# Patient Record
Sex: FEMALE | Race: BLACK OR AFRICAN AMERICAN | Employment: FULL TIME | URBAN - METROPOLITAN AREA
[De-identification: names, ages, dates, MRNs, and addresses within clinical notes are randomized per-mention and may not be internally consistent; named-entity substitution may affect disease eponyms.]

---

## 2018-02-12 ENCOUNTER — NON-PROVIDER VISIT (OUTPATIENT)
Dept: URGENT CARE | Facility: CLINIC | Age: 18
End: 2018-02-12

## 2018-02-12 DIAGNOSIS — Z11.1 PPD SCREENING TEST: ICD-10-CM

## 2018-02-12 PROCEDURE — 86580 TB INTRADERMAL TEST: CPT | Performed by: PHYSICIAN ASSISTANT

## 2018-02-12 NOTE — NON-PROVIDER
VISIT PPD PLACEMENT REASON:20947    1- TB evaluation questionnaire complete by Pt  2- Pt notified to return to clinic for reading after 48 hrs tiffany before 72 hrs , Copy was given  3- PPD placement documentation co,pleted  4- Questionnaire eval was filed .

## 2018-02-14 ENCOUNTER — NON-PROVIDER VISIT (OUTPATIENT)
Dept: URGENT CARE | Facility: CLINIC | Age: 18
End: 2018-02-14

## 2018-02-14 DIAGNOSIS — Z11.1 PPD SCREENING TEST: ICD-10-CM

## 2018-02-14 LAB — TB WHEAL 3D P 5 TU DIAM: 0 MM

## 2018-02-19 ENCOUNTER — NON-PROVIDER VISIT (OUTPATIENT)
Dept: URGENT CARE | Facility: CLINIC | Age: 18
End: 2018-02-19

## 2018-02-19 DIAGNOSIS — Z11.1 PPD SCREENING TEST: ICD-10-CM

## 2018-02-19 PROCEDURE — 86580 TB INTRADERMAL TEST: CPT | Performed by: PHYSICIAN ASSISTANT

## 2018-02-20 NOTE — NON-PROVIDER
VISIT PPD PLACEMENT REASON:[id number]    1- TB evaluation questionnaire complete by Pt  2- Pt notified to return to clinic for reading after 48 hrs tiffany before 72 hrs , Copy was given  3- PPD placement documentation co,pleted  4- Questionnaire eval was filed .

## 2018-02-22 ENCOUNTER — NON-PROVIDER VISIT (OUTPATIENT)
Dept: URGENT CARE | Facility: CLINIC | Age: 18
End: 2018-02-22

## 2018-02-22 DIAGNOSIS — Z11.1 PPD SCREENING TEST: ICD-10-CM

## 2018-02-22 LAB — TB WHEAL 3D P 5 TU DIAM: NORMAL MM

## 2018-06-02 ENCOUNTER — OFFICE VISIT (OUTPATIENT)
Dept: URGENT CARE | Facility: CLINIC | Age: 18
End: 2018-06-02
Payer: COMMERCIAL

## 2018-06-02 VITALS
TEMPERATURE: 97.5 F | OXYGEN SATURATION: 97 % | DIASTOLIC BLOOD PRESSURE: 72 MMHG | WEIGHT: 125 LBS | HEART RATE: 74 BPM | SYSTOLIC BLOOD PRESSURE: 110 MMHG | RESPIRATION RATE: 17 BRPM | HEIGHT: 64 IN | BODY MASS INDEX: 21.34 KG/M2

## 2018-06-02 DIAGNOSIS — S61.219A FINGER LACERATION, INITIAL ENCOUNTER: ICD-10-CM

## 2018-06-02 PROCEDURE — 12002 RPR S/N/AX/GEN/TRNK2.6-7.5CM: CPT | Performed by: FAMILY MEDICINE

## 2018-06-02 RX ORDER — AMOXICILLIN AND CLAVULANATE POTASSIUM 875; 125 MG/1; MG/1
1 TABLET, FILM COATED ORAL 2 TIMES DAILY
Qty: 6 TAB | Refills: 0 | Status: SHIPPED | OUTPATIENT
Start: 2018-06-02 | End: 2018-06-05

## 2018-06-02 ASSESSMENT — ENCOUNTER SYMPTOMS
CHILLS: 0
FEVER: 0
WEAKNESS: 0

## 2018-06-02 NOTE — PROGRESS NOTES
"Subjective:      Ruthy Russell is a 17 y.o. female who presents with Laceration (left hand index finger, cutting an avocado this mornig, knife sliped and cut finger)    Pleasant patient presents to urgent care with a laceration to her left proximal palmar finger joint region. Superficial laceration long mild active bleeding initially after she cut it she was cutting an elbow caught of this morning is right hand dominant and lacerated her finger. Her tetanus is up-to-date. She is very apprehensive of touching the finger cleaning observing the possibility of suturing. She does not have any complicated history of wound healing in the past. During conversation she starts to feel a little woozy and lightheaded asks if she can have something to eat.    HPI  Review of Systems   Constitutional: Negative for chills and fever.   Skin: Negative for itching.   Neurological: Negative for weakness.   All other systems reviewed and are negative.   Review of Systems performed. All other systems are negative except for what is listed above.     PMH:  has a past medical history of Known health problems: none.  MEDS:   Current Outpatient Prescriptions:   •  amoxicillin-clavulanate (AUGMENTIN) 875-125 MG Tab, Take 1 Tab by mouth 2 times a day for 3 days. With food and probiotics, Disp: 6 Tab, Rfl: 0  •  Estradiol Cypionate (DEPO-ESTRADIOL IM), by Intramuscular route., Disp: , Rfl:   •  ondansetron (ZOFRAN ODT) 4 MG TABLET DISPERSIBLE, Take 1 Tab by mouth every four hours as needed for Nausea/Vomiting., Disp: 10 Tab, Rfl: 1  ALLERGIES: No Known Allergies  SURGHX: History reviewed. No pertinent surgical history.  SOCHX:  reports that she has never smoked. She has never used smokeless tobacco. She reports that she does not drink alcohol or use drugs.  FH: Family history was reviewed, no pertinent findings to report   Objective:     /72   Pulse 74   Temp 36.4 °C (97.5 °F)   Resp 17   Ht 1.626 m (5' 4\")   Wt 56.7 kg (125 lb)   " SpO2 97%   Breastfeeding? No   BMI 21.46 kg/m²      Physical Exam   Constitutional: She is oriented to person, place, and time. She appears well-developed and well-nourished. No distress.   HENT:   Mouth/Throat: Oropharynx is clear and moist.   Neck: Normal range of motion.   Cardiovascular: Normal rate.    Pulmonary/Chest: Effort normal.   Musculoskeletal: Normal range of motion. She exhibits tenderness. She exhibits no edema or deformity.        Left hand: She exhibits tenderness and laceration. She exhibits normal range of motion and no bony tenderness. Normal sensation noted. Normal strength noted.        Hands:  Superficial but long laceration of finger no active bleeding 2.7cm   Neurological: She is alert and oriented to person, place, and time. Coordination normal.   Skin: Skin is warm and dry. No rash noted. She is not diaphoretic. No erythema. No pallor.   Psychiatric: She has a normal mood and affect. Her behavior is normal. Judgment and thought content normal.         Procedures: Area was prepped and cleaned and observed no evidence of foreign body patient did not tolerate this very well even after topical lidocaine application. Dermabond was applied with good approximation as well as some Steri-Strips.   Assessment/Plan:     1. Finger laceration, initial encounter  amoxicillin-clavulanate (AUGMENTIN) 875-125 MG Tab   Differential diagnosis, natural history, supportive care discussed. Follow-up with primary care provider within 7-10 days, emergency room precautions discussed.  Patient and/or family appears understanding of information.    She is reassured that she can come back to urgent care if she notices any signs of infection or wound does not appear that is healing properly. We discussed sutures to this site the patient was very fearful and preferred to do Dermabond

## 2019-07-30 ENCOUNTER — OFFICE VISIT (OUTPATIENT)
Dept: URGENT CARE | Facility: CLINIC | Age: 19
End: 2019-07-30
Payer: COMMERCIAL

## 2019-07-30 VITALS
BODY MASS INDEX: 31.01 KG/M2 | OXYGEN SATURATION: 96 % | HEIGHT: 63 IN | TEMPERATURE: 98.4 F | WEIGHT: 175 LBS | RESPIRATION RATE: 14 BRPM | HEART RATE: 76 BPM | SYSTOLIC BLOOD PRESSURE: 124 MMHG | DIASTOLIC BLOOD PRESSURE: 76 MMHG

## 2019-07-30 DIAGNOSIS — M54.50 ACUTE BILATERAL LOW BACK PAIN WITHOUT SCIATICA: ICD-10-CM

## 2019-07-30 LAB
APPEARANCE UR: CLEAR
BILIRUB UR STRIP-MCNC: NEGATIVE MG/DL
COLOR UR AUTO: YELLOW
GLUCOSE UR STRIP.AUTO-MCNC: NEGATIVE MG/DL
KETONES UR STRIP.AUTO-MCNC: NEGATIVE MG/DL
LEUKOCYTE ESTERASE UR QL STRIP.AUTO: NEGATIVE
NITRITE UR QL STRIP.AUTO: NEGATIVE
PH UR STRIP.AUTO: 6 [PH] (ref 5–8)
PROT UR QL STRIP: NORMAL MG/DL
RBC UR QL AUTO: NEGATIVE
SP GR UR STRIP.AUTO: 1.02
UROBILINOGEN UR STRIP-MCNC: 0.2 MG/DL

## 2019-07-30 PROCEDURE — 99203 OFFICE O/P NEW LOW 30 MIN: CPT | Performed by: PHYSICIAN ASSISTANT

## 2019-07-30 PROCEDURE — 81002 URINALYSIS NONAUTO W/O SCOPE: CPT | Performed by: PHYSICIAN ASSISTANT

## 2019-07-30 RX ORDER — CYCLOBENZAPRINE HCL 10 MG
10 TABLET ORAL 3 TIMES DAILY PRN
Qty: 30 TAB | Refills: 0 | Status: SHIPPED | OUTPATIENT
Start: 2019-07-30 | End: 2020-09-30

## 2019-07-30 RX ORDER — PREDNISONE 20 MG/1
20 TABLET ORAL DAILY
Qty: 30 TAB | Refills: 0 | Status: SHIPPED | OUTPATIENT
Start: 2019-07-30 | End: 2020-09-30

## 2019-07-30 RX ORDER — TRAMADOL HYDROCHLORIDE 50 MG/1
50 TABLET ORAL EVERY 8 HOURS PRN
Qty: 10 TAB | Refills: 0 | Status: SHIPPED | OUTPATIENT
Start: 2019-07-30 | End: 2019-08-02

## 2019-07-30 NOTE — LETTER
July 30, 2019         Patient: Brooke Burciaga   YOB: 2000   Date of Visit: 7/30/2019           To Whom it May Concern:    Brooke Burciaga was seen in my clinic on 7/30/2019. Please excuse her absence on 7/31/19.     If you have any questions or concerns, please don't hesitate to call.        Sincerely,           Alice Palmer P.A.-C.  Electronically Signed

## 2019-08-01 ASSESSMENT — ENCOUNTER SYMPTOMS
VOMITING: 0
LEG PAIN: 0
BOWEL INCONTINENCE: 0
FEVER: 0
DIZZINESS: 0
NAUSEA: 0
BACK PAIN: 1
ABDOMINAL PAIN: 0
TINGLING: 0
SHORTNESS OF BREATH: 0
DIARRHEA: 0
CHILLS: 0
FALLS: 0
SPUTUM PRODUCTION: 0
NUMBNESS: 0

## 2019-08-01 NOTE — PROGRESS NOTES
"Subjective:      Brooke Burciaga is a 19 y.o. female who presents with Back Pain (low back pain, sharp spasms after going to the gym x 1 week ago)            Back Pain   This is a new problem. The current episode started 1 to 4 weeks ago (1 week). The problem occurs constantly. The problem has been waxing and waning since onset. The pain is present in the lumbar spine. The pain does not radiate. The pain is at a severity of 6/10. The pain is moderate. Pertinent negatives include no abdominal pain, bladder incontinence, bowel incontinence, chest pain, fever, leg pain, numbness or tingling. She has tried NSAIDs for the symptoms. The treatment provided mild relief.     Patient presents to urgent care reporting a 1 week history of low back pain and sharp spasms. She believes it started after going to the gym, although she denies any specific injuries.     Review of Systems   Constitutional: Negative for chills and fever.   HENT: Negative for congestion.    Respiratory: Negative for sputum production and shortness of breath.    Cardiovascular: Negative for chest pain.   Gastrointestinal: Negative for abdominal pain, bowel incontinence, diarrhea, nausea and vomiting.   Genitourinary: Negative.  Negative for bladder incontinence.   Musculoskeletal: Positive for back pain. Negative for falls.   Skin: Negative for rash.   Neurological: Negative for dizziness, tingling and numbness.        Objective:     /76 (BP Location: Left arm, Patient Position: Sitting, BP Cuff Size: Adult)   Pulse 76   Temp 36.9 °C (98.4 °F) (*RETIRED* Temporal)   Resp 14   Ht 1.6 m (5' 2.99\")   Wt 79.4 kg (175 lb)   SpO2 96%   BMI 31.01 kg/m²      Physical Exam   Constitutional: She is oriented to person, place, and time. She appears well-developed and well-nourished. No distress.   HENT:   Head: Normocephalic and atraumatic.   Eyes: Pupils are equal, round, and reactive to light.   Neck: Normal range of motion.   Cardiovascular: Normal rate. "   Pulmonary/Chest: Effort normal.   Abdominal: Soft. Bowel sounds are normal. She exhibits no distension. There is no tenderness. There is no guarding.   No CVAT bilaterally    Musculoskeletal:        Lumbar back: She exhibits decreased range of motion, tenderness and pain. She exhibits no bony tenderness and no spasm.        Back:    No midline spinal tenderness or step off deformities noted. Straight leg raise negative.    Neurological: She is alert and oriented to person, place, and time.   Skin: Skin is warm and dry. She is not diaphoretic.   Psychiatric: She has a normal mood and affect. Her behavior is normal.   Nursing note and vitals reviewed.          PMH:  has no past medical history on file.  MEDS:   Current Outpatient Medications:   •  predniSONE (DELTASONE) 20 MG Tab, Take 1 Tab by mouth every day., Disp: 30 Tab, Rfl: 0  •  tramadol (ULTRAM) 50 MG Tab, Take 1 Tab by mouth every 8 hours as needed for up to 3 days., Disp: 10 Tab, Rfl: 0  •  cyclobenzaprine (FLEXERIL) 10 MG Tab, Take 1 Tab by mouth 3 times a day as needed., Disp: 30 Tab, Rfl: 0  ALLERGIES: No Known Allergies  SURGHX: History reviewed. No pertinent surgical history.  SOCHX:  reports that she has never smoked. She has never used smokeless tobacco. She reports that she does not drink alcohol or use drugs.  FH: family history is not on file.     POCT Urinalysis:      Ref Range & Units 2d ago   POC Color Negative yellow    POC Appearance Negative clear    POC Leukocyte Esterase Negative negative    POC Nitrites Negative negative    POC Urobiligen Negative (0.2) mg/dL 0.2    POC Protein Negative mg/dL nnegative    POC Urine PH 5.0 - 8.0 6.0    POC Blood Negative negative    POC Specific Gravity <1.005 - >1.030 1.020    POC Ketones Negative mg/dL negative    POC Bilirubin Negative mg/dL negative    POC Glucose Negative mg/dL negative          Specimen Collected: 07/30/19  3:24 PM   Last Resulted: 07/30/19  3:24 PM          Assessment/Plan:     1.  Acute bilateral low back pain without sciatica  - POCT Urinalysis --> normal  - POCT Pregnancy --> negative   - predniSONE (DELTASONE) 20 MG Tab; Take 1 Tab by mouth every day.  Dispense: 30 Tab; Refill: 0  - tramadol (ULTRAM) 50 MG Tab; Take 1 Tab by mouth every 8 hours as needed for up to 3 days.  Dispense: 10 Tab; Refill: 0  - cyclobenzaprine (FLEXERIL) 10 MG Tab; Take 1 Tab by mouth 3 times a day as needed.  Dispense: 30 Tab; Refill: 0   - Will cause sedation, avoid driving, operating heavy machinery, and drinking alcohol  - Consent for Opiate Prescription    Encouraged icing/heating 2-3 times daily followed by gentle massage and stretching. Avoid use of nsaids while taking course of oral steroids due to increased risk of GI bleed. Tylenol as needed for mild-moderate pain, tramadol given to take as needed for severe pain. RTC or follow up with PCP if symptoms persist/worsen. The patient demonstrated a good understanding and agreed with the treatment plan.    [facility] Aware web site evaluation: I have obtained and reviewed patient utilization report from [facility] of the [facility] pharmacy database prior to writing prescription for controlled substance II, III or IV per [place] bill . Based on the report and my clinical assessment the prescription is medically necessary.

## 2019-09-12 ENCOUNTER — NON-PROVIDER VISIT (OUTPATIENT)
Dept: OCCUPATIONAL MEDICINE | Facility: CLINIC | Age: 19
End: 2019-09-12

## 2019-09-12 DIAGNOSIS — Z11.1 ENCOUNTER FOR PPD TEST: Primary | ICD-10-CM

## 2019-09-12 PROCEDURE — 86580 TB INTRADERMAL TEST: CPT | Performed by: PREVENTIVE MEDICINE

## 2019-09-15 ENCOUNTER — NON-PROVIDER VISIT (OUTPATIENT)
Dept: URGENT CARE | Facility: CLINIC | Age: 19
End: 2019-09-15

## 2019-09-15 LAB — TB WHEAL 3D P 5 TU DIAM: 0 MM

## 2019-09-15 NOTE — PROGRESS NOTES
Brooke Burciaga is a 19 y.o. female here for a non-provider visit for PPD reading -- Step 1 of 2.      1.  Resulted in Epic under enter/edit results? Yes   2.  TB evaluation questionnaire scanned into chart and original given to patient?Yes      3. Was induration greater than 0 mm? No.    If Step 1 of 2, when is patient returning for second step (delete if N/A): 9/19/19    Routed to PCP? Yes

## 2019-11-23 ENCOUNTER — OFFICE VISIT (OUTPATIENT)
Dept: URGENT CARE | Facility: CLINIC | Age: 19
End: 2019-11-23
Payer: COMMERCIAL

## 2019-11-23 VITALS
TEMPERATURE: 97.7 F | BODY MASS INDEX: 28.61 KG/M2 | RESPIRATION RATE: 12 BRPM | OXYGEN SATURATION: 98 % | HEART RATE: 99 BPM | WEIGHT: 167.6 LBS | SYSTOLIC BLOOD PRESSURE: 118 MMHG | HEIGHT: 64 IN | DIASTOLIC BLOOD PRESSURE: 66 MMHG

## 2019-11-23 DIAGNOSIS — J02.0 STREP PHARYNGITIS: ICD-10-CM

## 2019-11-23 DIAGNOSIS — J01.00 ACUTE NON-RECURRENT MAXILLARY SINUSITIS: ICD-10-CM

## 2019-11-23 DIAGNOSIS — R05.9 COUGH: ICD-10-CM

## 2019-11-23 LAB
INT CON NEG: NEGATIVE
INT CON POS: POSITIVE
S PYO AG THROAT QL: POSITIVE

## 2019-11-23 PROCEDURE — 87880 STREP A ASSAY W/OPTIC: CPT | Performed by: FAMILY MEDICINE

## 2019-11-23 PROCEDURE — 99214 OFFICE O/P EST MOD 30 MIN: CPT | Performed by: FAMILY MEDICINE

## 2019-11-23 RX ORDER — BENZONATATE 200 MG/1
200 CAPSULE ORAL 3 TIMES DAILY PRN
Qty: 30 CAP | Refills: 0 | Status: SHIPPED | OUTPATIENT
Start: 2019-11-23 | End: 2020-09-30

## 2019-11-23 RX ORDER — AMOXICILLIN AND CLAVULANATE POTASSIUM 875; 125 MG/1; MG/1
1 TABLET, FILM COATED ORAL 2 TIMES DAILY
Qty: 20 TAB | Refills: 0 | Status: SHIPPED | OUTPATIENT
Start: 2019-11-23 | End: 2019-12-03

## 2019-11-23 RX ORDER — AMOXICILLIN AND CLAVULANATE POTASSIUM 875; 125 MG/1; MG/1
1 TABLET, FILM COATED ORAL 2 TIMES DAILY
Qty: 14 TAB | Refills: 0 | Status: SHIPPED | OUTPATIENT
Start: 2019-11-23 | End: 2019-11-23

## 2019-11-23 ASSESSMENT — ENCOUNTER SYMPTOMS
MYALGIAS: 0
HEADACHES: 1
WEIGHT LOSS: 0
VOMITING: 0
EYE REDNESS: 0
EYE DISCHARGE: 0
NAUSEA: 0

## 2019-11-23 NOTE — PROGRESS NOTES
"Subjective:      Brooke Burciaga is a 19 y.o. female who presents with Sinus Pain (x 2 weeks.  Pt. complains of sinus pain, sinus and chest congestion, headache, cough, chills and sore throat. )            2 weeks sinus pressure and drainage.  Subjective fever and chills.  No PMH sinusitis.  Cough and sore throat due to drainage.  No wheeze or SOB. No relief of severe symptoms with OTC medications.  No other aggravating or alleviating factors.      Review of Systems   Constitutional: Negative for malaise/fatigue and weight loss.   HENT: Negative for ear discharge and ear pain.    Eyes: Negative for discharge and redness.   Gastrointestinal: Negative for nausea and vomiting.   Musculoskeletal: Negative for joint pain and myalgias.   Skin: Negative for itching and rash.   Neurological: Positive for headaches.     .  Medications, Allergies, and current problem list reviewed today in Epic       Objective:     /66   Pulse 99   Temp 36.5 °C (97.7 °F) (Temporal)   Resp 12   Ht 1.626 m (5' 4\")   Wt 76 kg (167 lb 9.6 oz)   LMP 11/08/2019   SpO2 98%   BMI 28.77 kg/m²      Physical Exam  Constitutional:       General: She is not in acute distress.     Appearance: She is well-developed.   HENT:      Head: Normocephalic and atraumatic.      Comments: Tender maxillary sinus     Right Ear: Tympanic membrane normal.      Left Ear: Tympanic membrane normal.      Nose: Congestion and rhinorrhea present.      Mouth/Throat:      Pharynx: Posterior oropharyngeal erythema present. No oropharyngeal exudate.   Eyes:      Conjunctiva/sclera: Conjunctivae normal.   Neck:      Musculoskeletal: Neck supple.   Cardiovascular:      Rate and Rhythm: Normal rate and regular rhythm.      Heart sounds: Normal heart sounds. No murmur.   Pulmonary:      Effort: Pulmonary effort is normal.      Breath sounds: Normal breath sounds. No wheezing.   Lymphadenopathy:      Cervical: Cervical adenopathy present.   Skin:     General: Skin is warm " and dry.      Findings: No rash.   Neurological:      Mental Status: She is alert and oriented to person, place, and time.                 Assessment/Plan:     1. Acute non-recurrent maxillary sinusitis  amoxicillin-clavulanate (AUGMENTIN) 875-125 MG Tab    DISCONTINUED: amoxicillin-clavulanate (AUGMENTIN) 875-125 MG Tab   2. Cough  benzonatate (TESSALON) 200 MG capsule   3. Strep pharyngitis  POCT Rapid Strep A    amoxicillin-clavulanate (AUGMENTIN) 875-125 MG Tab       Differential diagnosis, natural history, supportive care, and indications for immediate follow-up discussed at length.     Nasal saline, decongestant, nasal CS

## 2019-11-23 NOTE — LETTER
November 23, 2019         Patient: Brooke Burciaga   YOB: 2000   Date of Visit: 11/23/2019           To Whom it May Concern:    Brooke Burciaga was seen in my clinic on 11/23/2019. Please excuse 11/23 and 11/24/2019.    Sincerely,           Virgil Harris M.D.  Electronically Signed

## 2020-03-10 ENCOUNTER — OFFICE VISIT (OUTPATIENT)
Dept: URGENT CARE | Facility: CLINIC | Age: 20
End: 2020-03-10
Payer: COMMERCIAL

## 2020-03-10 VITALS
HEIGHT: 63 IN | RESPIRATION RATE: 16 BRPM | OXYGEN SATURATION: 96 % | BODY MASS INDEX: 22.32 KG/M2 | WEIGHT: 126 LBS | TEMPERATURE: 98.5 F | DIASTOLIC BLOOD PRESSURE: 78 MMHG | SYSTOLIC BLOOD PRESSURE: 120 MMHG | HEART RATE: 80 BPM

## 2020-03-10 DIAGNOSIS — R11.10 INTRACTABLE VOMITING, PRESENCE OF NAUSEA NOT SPECIFIED, UNSPECIFIED VOMITING TYPE: ICD-10-CM

## 2020-03-10 PROCEDURE — 99214 OFFICE O/P EST MOD 30 MIN: CPT | Performed by: NURSE PRACTITIONER

## 2020-03-10 RX ORDER — ACETAMINOPHEN 500 MG
1000 TABLET ORAL ONCE
Status: DISCONTINUED | OUTPATIENT
Start: 2020-03-10 | End: 2020-03-10

## 2020-03-10 RX ORDER — ONDANSETRON 4 MG/1
4 TABLET, ORALLY DISINTEGRATING ORAL EVERY 6 HOURS PRN
Qty: 10 TAB | Refills: 0 | Status: SHIPPED | OUTPATIENT
Start: 2020-03-10

## 2020-03-10 RX ORDER — ONDANSETRON 2 MG/ML
4 INJECTION INTRAMUSCULAR; INTRAVENOUS ONCE
Status: COMPLETED | OUTPATIENT
Start: 2020-03-10 | End: 2020-03-10

## 2020-03-10 RX ADMIN — ONDANSETRON 4 MG: 2 INJECTION INTRAMUSCULAR; INTRAVENOUS at 20:48

## 2020-03-10 ASSESSMENT — ENCOUNTER SYMPTOMS
VOMITING: 1
CHILLS: 0
FEVER: 0

## 2020-03-11 NOTE — PROGRESS NOTES
Subjective:      Ruthy Russell is a 19 y.o. female who presents with Vomiting (unable to keep anything down, nausea, chills, x1 day )    Past Medical History:   Diagnosis Date   • Known health problems: none      Social History     Socioeconomic History   • Marital status: Single     Spouse name: Not on file   • Number of children: Not on file   • Years of education: Not on file   • Highest education level: Not on file   Occupational History   • Not on file   Social Needs   • Financial resource strain: Not on file   • Food insecurity     Worry: Not on file     Inability: Not on file   • Transportation needs     Medical: Not on file     Non-medical: Not on file   Tobacco Use   • Smoking status: Never Smoker   • Smokeless tobacco: Never Used   Substance and Sexual Activity   • Alcohol use: No   • Drug use: No   • Sexual activity: Not on file   Lifestyle   • Physical activity     Days per week: Not on file     Minutes per session: Not on file   • Stress: Not on file   Relationships   • Social connections     Talks on phone: Not on file     Gets together: Not on file     Attends Orthodoxy service: Not on file     Active member of club or organization: Not on file     Attends meetings of clubs or organizations: Not on file     Relationship status: Not on file   • Intimate partner violence     Fear of current or ex partner: Not on file     Emotionally abused: Not on file     Physically abused: Not on file     Forced sexual activity: Not on file   Other Topics Concern   • Behavioral problems Not Asked   • Interpersonal relationships Not Asked   • Sad or not enjoying activities Not Asked   • Suicidal thoughts Not Asked   • Poor school performance Not Asked   • Reading difficulties Not Asked   • Speech difficulties Not Asked   • Writing difficulties Not Asked   • Inadequate sleep Not Asked   • Excessive TV viewing Not Asked   • Excessive video game use Not Asked   • Inadequate exercise Not Asked   • Sports related Not Asked  "  • Poor diet Not Asked   • Family concerns for drug/alcohol abuse Not Asked   • Poor oral hygiene Not Asked   • Bike safety Not Asked   • Family concerns vehicle safety Not Asked   Social History Narrative   • Not on file     History reviewed. No pertinent family history.    Allergies: Patient has no known allergies.    Patient is a 19-year-old female who presents today with complaint of nausea and vomiting with chills and malaise.  Symptoms started today.  Patient states she did go out last night and with her friends tried some cocaine.  She is not having any chest pain or shortness of breath, no palpitations.  Denies abdominal pain or diarrhea.        Vomiting   This is a new problem. The current episode started yesterday. The problem has been unchanged. Associated symptoms include vomiting. Pertinent negatives include no chills or fever. Nothing aggravates the symptoms. She has tried nothing for the symptoms. The treatment provided no relief.   Other   Associated symptoms include vomiting. Pertinent negatives include no chills or fever.       Review of Systems   Constitutional: Positive for malaise/fatigue. Negative for chills and fever.   Gastrointestinal: Positive for vomiting.   All other systems reviewed and are negative.         Objective:     /78 (BP Location: Right arm, Patient Position: Sitting, BP Cuff Size: Adult)   Pulse 80   Temp 36.9 °C (98.5 °F) (Temporal)   Resp 16   Ht 1.6 m (5' 3\")   Wt 57.2 kg (126 lb)   SpO2 96%   BMI 22.32 kg/m²      Physical Exam  Constitutional:       Appearance: Normal appearance.   HENT:      Head: Normocephalic and atraumatic.      Right Ear: Tympanic membrane, ear canal and external ear normal.      Left Ear: Tympanic membrane, ear canal and external ear normal.      Nose: Nose normal.      Mouth/Throat:      Mouth: Mucous membranes are moist.   Eyes:      Extraocular Movements: Extraocular movements intact.      Conjunctiva/sclera: Conjunctivae normal.     "  Pupils: Pupils are equal, round, and reactive to light.   Neck:      Musculoskeletal: Normal range of motion and neck supple.   Cardiovascular:      Rate and Rhythm: Normal rate and regular rhythm.      Heart sounds: Normal heart sounds.   Pulmonary:      Effort: Pulmonary effort is normal.      Breath sounds: Normal breath sounds.   Abdominal:      General: Abdomen is flat.      Palpations: Abdomen is soft.      Tenderness: There is no abdominal tenderness. There is no guarding or rebound.   Musculoskeletal: Normal range of motion.   Skin:     General: Skin is warm and dry.      Capillary Refill: Capillary refill takes less than 2 seconds.   Neurological:      Mental Status: She is alert and oriented to person, place, and time.   Psychiatric:         Mood and Affect: Mood normal.         Behavior: Behavior normal.         Thought Content: Thought content normal.         Judgment: Judgment normal.                 Assessment/Plan:       1.  Gastritis and vomiting    Zofran IM given in office  Prescription for Zofran ODT sent to patient's pharmacy  Push fluids  Strict ER precautions for intractable vomiting or developing abdominal pain  Return to urgent care otherwise for any further questions or concerns

## 2020-05-07 ENCOUNTER — OFFICE VISIT (OUTPATIENT)
Dept: URGENT CARE | Facility: CLINIC | Age: 20
End: 2020-05-07
Payer: COMMERCIAL

## 2020-05-07 VITALS
DIASTOLIC BLOOD PRESSURE: 74 MMHG | HEART RATE: 82 BPM | HEIGHT: 64 IN | TEMPERATURE: 97.8 F | WEIGHT: 186 LBS | RESPIRATION RATE: 14 BRPM | SYSTOLIC BLOOD PRESSURE: 122 MMHG | BODY MASS INDEX: 31.76 KG/M2 | OXYGEN SATURATION: 97 %

## 2020-05-07 DIAGNOSIS — N83.202 CYST OF LEFT OVARY: ICD-10-CM

## 2020-05-07 DIAGNOSIS — R11.0 NAUSEA: ICD-10-CM

## 2020-05-07 DIAGNOSIS — M54.50 ACUTE BILATERAL LOW BACK PAIN WITHOUT SCIATICA: ICD-10-CM

## 2020-05-07 LAB
APPEARANCE UR: CLEAR
BILIRUB UR STRIP-MCNC: NEGATIVE MG/DL
COLOR UR AUTO: YELLOW
GLUCOSE UR STRIP.AUTO-MCNC: NEGATIVE MG/DL
INT CON NEG: NORMAL
INT CON POS: NORMAL
KETONES UR STRIP.AUTO-MCNC: NEGATIVE MG/DL
LEUKOCYTE ESTERASE UR QL STRIP.AUTO: NEGATIVE
NITRITE UR QL STRIP.AUTO: NEGATIVE
PH UR STRIP.AUTO: 5.5 [PH] (ref 5–8)
POC URINE PREGNANCY TEST: NEGATIVE
PROT UR QL STRIP: NEGATIVE MG/DL
RBC UR QL AUTO: NEGATIVE
SP GR UR STRIP.AUTO: 1.01
UROBILINOGEN UR STRIP-MCNC: 0.2 MG/DL

## 2020-05-07 PROCEDURE — 99214 OFFICE O/P EST MOD 30 MIN: CPT | Performed by: PHYSICIAN ASSISTANT

## 2020-05-07 PROCEDURE — 81002 URINALYSIS NONAUTO W/O SCOPE: CPT | Performed by: PHYSICIAN ASSISTANT

## 2020-05-07 PROCEDURE — 81025 URINE PREGNANCY TEST: CPT | Performed by: PHYSICIAN ASSISTANT

## 2020-05-07 RX ORDER — CYCLOBENZAPRINE HCL 5 MG
5 TABLET ORAL 3 TIMES DAILY PRN
Qty: 30 TAB | Refills: 0 | Status: SHIPPED | OUTPATIENT
Start: 2020-05-07 | End: 2020-09-30

## 2020-05-07 ASSESSMENT — ENCOUNTER SYMPTOMS
BLOOD IN STOOL: 0
NECK PAIN: 0
DIARRHEA: 0
CHILLS: 0
CONSTIPATION: 0
PHOTOPHOBIA: 0
MYALGIAS: 0
BACK PAIN: 1
DIZZINESS: 0
DOUBLE VISION: 0
FEVER: 0
COUGH: 0
BLURRED VISION: 0
VOMITING: 0
SENSORY CHANGE: 0
TINGLING: 0
HEADACHES: 1
NAUSEA: 1
FOCAL WEAKNESS: 0
WEAKNESS: 0
ABDOMINAL PAIN: 1

## 2020-05-07 NOTE — PROGRESS NOTES
"Subjective:      Brooke Burciaga is a 19 y.o. female who presents with Low Back Pain (low back pain x 1 month - worsening and now having lower abd pain)            HPI  19-year-old female presents to urgent care with new problem of left sided low back pain onset about 1 month ago.  Back pain is worse with certain movements and lifting.  Patient also reports left lower abdomen pain described as \"sharp\". Pain is worse at night.  LNMP: about 2 weeks ago.   Denies change in bowel habits.  Denies urinary/bowel incontinence.  No saddle anesthesias.  No unilateral weakness.  No radiculopathy.  Denies history of abdominal surgeries.   Family history of ovarian cysts.   Patient has been taking ibuprofen with some pain relief.  Denies other associated aggravating or alleviating factors.     Review of Systems   Constitutional: Negative for chills, fever and malaise/fatigue.   Eyes: Negative for blurred vision, double vision and photophobia.   Respiratory: Negative for cough.    Cardiovascular: Negative for chest pain.   Gastrointestinal: Positive for abdominal pain and nausea. Negative for blood in stool, constipation, diarrhea and vomiting.   Musculoskeletal: Positive for back pain. Negative for myalgias and neck pain.   Skin: Negative for rash.   Neurological: Positive for headaches. Negative for dizziness, tingling, sensory change, focal weakness and weakness.   All other systems reviewed and are negative.      History reviewed. No pertinent past medical history.  Medications and allergies reviewed in epic.  Social History     Tobacco Use   • Smoking status: Never Smoker   • Smokeless tobacco: Never Used   Substance Use Topics   • Alcohol use: No     Alcohol/week: 0.0 oz      Objective:     /74 (BP Location: Left arm, Patient Position: Sitting, BP Cuff Size: Large adult)   Pulse 82   Temp 36.6 °C (97.8 °F) (Temporal)   Resp 14   Ht 1.626 m (5' 4\")   Wt 84.4 kg (186 lb)   SpO2 97%   BMI 31.93 kg/m²      Physical " Exam  Vitals signs reviewed.   Constitutional:       General: She is not in acute distress.     Appearance: Normal appearance. She is well-developed. She is not ill-appearing.   HENT:      Head: Normocephalic and atraumatic.   Eyes:      Conjunctiva/sclera: Conjunctivae normal.   Neck:      Musculoskeletal: Normal range of motion and neck supple.   Cardiovascular:      Rate and Rhythm: Normal rate and regular rhythm.   Pulmonary:      Effort: Pulmonary effort is normal. No respiratory distress.   Abdominal:      General: Bowel sounds are normal. There is no distension.      Palpations: Abdomen is soft.      Tenderness: There is abdominal tenderness in the left lower quadrant. There is no right CVA tenderness, left CVA tenderness, guarding or rebound.   Musculoskeletal: Normal range of motion.        Arms:    Skin:     General: Skin is warm and dry.      Findings: No erythema.   Neurological:      General: No focal deficit present.      Mental Status: She is alert and oriented to person, place, and time.      Cranial Nerves: No cranial nerve deficit.      Motor: No weakness.      Coordination: Coordination normal.      Gait: Gait normal.      Comments: Strengths are 5 out of 5 bilateral upper and lower extremities.   Psychiatric:         Mood and Affect: Mood normal.         Behavior: Behavior normal.         Thought Content: Thought content normal.         Judgment: Judgment normal.                 Assessment/Plan:     1. Acute bilateral low back pain without sciatica  POCT Urinalysis    POCT Pregnancy    cyclobenzaprine (FLEXERIL) 5 MG tablet   2. Left lower quadrant abdominal pain  US-PELVIC COMPLETE (TRANSABDOMINAL/TRANSVAGINAL) (COMBO)     Results for orders placed or performed in visit on 05/07/20   POCT Urinalysis   Result Value Ref Range    POC Color Yellow Negative    POC Appearance Clear Negative    POC Leukocyte Esterase Negative Negative    POC Nitrites Negative Negative    POC Urobiligen 0.2 Negative  (0.2) mg/dL    POC Protein Negative Negative mg/dL    POC Urine PH 5.5 5.0 - 8.0    POC Blood Negative Negative    POC Specific Gravity 1.015 <1.005 - >1.030    POC Ketones Negative Negative mg/dL    POC Bilirubin Negative Negative mg/dL    POC Glucose Negative Negative mg/dL   POCT Pregnancy   Result Value Ref Range    POC Urine Pregnancy Test Negative Negative    Internal Control Positive Valid     Internal Control Negative Valid      1.  Patient advised to alternate over-the-counter Tylenol and Motrin for pain.  Apply heat to affected area and perform gentle stretches and range of motion exercises as tolerated with pain.  Avoid activity that causes increase in pain such as lifting.     2.  Attempted to schedule pelvic ultrasound for evaluation of left lower quadrant abdominal pain.  Patient's insurance carrier only allows for imaging to be done through [facility].  Patient will call to schedule an appointment.    PT should follow up with PCP in 1-2 days for re-evaluation if symptoms have not improved.  Discussed red flags and reasons to return to UC or ED.  Pt and/or family verbalized understanding of diagnosis and follow up instructions and was offered informational handout on diagnosis.  PT discharged.       Addendum: Spoke with patient directly by phone regarding US results showing left ovarian cyst. US reports is scanned into Media tab.    Referral to follow up with GYN. Recommend heat and ibuprofen for pain. Given Zofran for nausea.   ER precautions and follow up instructions given.

## 2020-05-27 ENCOUNTER — HOSPITAL ENCOUNTER (OUTPATIENT)
Dept: RADIOLOGY | Facility: MEDICAL CENTER | Age: 20
End: 2020-05-27
Payer: COMMERCIAL

## 2020-05-27 ENCOUNTER — TELEPHONE (OUTPATIENT)
Dept: URGENT CARE | Facility: CLINIC | Age: 20
End: 2020-05-27

## 2020-05-27 NOTE — TELEPHONE ENCOUNTER
Nolan Hunter,    The patient called [facility] to follow up on visit and to review the US-Pelvis Transabdominal and Endovaginal results.     The report is scanned into media for review and the images are being pushed into the chart.     Please call the patient at [phone].    Nicole

## 2020-05-28 RX ORDER — ONDANSETRON 4 MG/1
4 TABLET, ORALLY DISINTEGRATING ORAL EVERY 6 HOURS PRN
Qty: 15 TAB | Refills: 0 | Status: SHIPPED | OUTPATIENT
Start: 2020-05-28 | End: 2020-09-30

## 2020-06-22 ENCOUNTER — TELEPHONE (OUTPATIENT)
Dept: URGENT CARE | Facility: CLINIC | Age: 20
End: 2020-06-22

## 2020-06-22 NOTE — TELEPHONE ENCOUNTER
Nolan Hunter,    The patient called [facility] to see if you can refill the Zofran medication from the urgent care 5/28/2020. The patient stated she is pending an appointment with OBGYN at [place]. The patient stated the nausea is severe when the patient is about to start her cycle.    If you have any questions, please call the patient at Tel: [phone].      Outpatient Medication Detail      Disp  Refills  Start  End     ondansetron (ZOFRAN ODT) 4 MG TABLET DISPERSIBLE  15 Tab  0/0  5/28/2020      Sig - Route: Take 1 Tab by mouth every 6 hours as needed for Nausea. - Oral     Sent to pharmacy as: Ondansetron 4 MG Oral Tablet Disintegrating (ZOFRAN ODT)     E-Prescribing Status: Receipt confirmed by pharmacy (5/28/2020 10:06 AM PDT)     Renewals     Renewal requests to authorizing provider (Ximena Hunter PJO ANN) prohibited        Pharmacy     [id number] - [place], [place] - [address] AT [address]

## 2020-06-23 NOTE — TELEPHONE ENCOUNTER
Can you let her know she needs to be re-evaluated if she is still having symptoms. I saw her 5/7/2020 and gave her a follow up with GYN. This has been authorized and she needs to make an appointment with GYN if she is still having symptoms or re-eval in case something else is going on.   Thanks !

## 2020-06-23 NOTE — TELEPHONE ENCOUNTER
I called the patient at [phone], left a message with your recommendations and asked the patient to call [phone], is she has further questions and or to follow up with the referral to GYN promptly and or to be re-evaluated in urgent care.SAH

## 2021-06-29 ENCOUNTER — OFFICE VISIT (OUTPATIENT)
Dept: URGENT CARE | Facility: CLINIC | Age: 21
End: 2021-06-29
Payer: COMMERCIAL

## 2021-06-29 ENCOUNTER — HOSPITAL ENCOUNTER (OUTPATIENT)
Facility: MEDICAL CENTER | Age: 21
End: 2021-06-29
Attending: PHYSICIAN ASSISTANT
Payer: COMMERCIAL

## 2021-06-29 VITALS
HEIGHT: 63 IN | DIASTOLIC BLOOD PRESSURE: 72 MMHG | TEMPERATURE: 98.5 F | HEART RATE: 77 BPM | SYSTOLIC BLOOD PRESSURE: 120 MMHG | BODY MASS INDEX: 31.01 KG/M2 | OXYGEN SATURATION: 99 % | WEIGHT: 175 LBS | RESPIRATION RATE: 16 BRPM

## 2021-06-29 DIAGNOSIS — J01.40 ACUTE NON-RECURRENT PANSINUSITIS: ICD-10-CM

## 2021-06-29 DIAGNOSIS — Z20.822 EXPOSURE TO COVID-19 VIRUS: ICD-10-CM

## 2021-06-29 LAB — COVID ORDER STATUS COVID19: NORMAL

## 2021-06-29 PROCEDURE — U0003 INFECTIOUS AGENT DETECTION BY NUCLEIC ACID (DNA OR RNA); SEVERE ACUTE RESPIRATORY SYNDROME CORONAVIRUS 2 (SARS-COV-2) (CORONAVIRUS DISEASE [COVID-19]), AMPLIFIED PROBE TECHNIQUE, MAKING USE OF HIGH THROUGHPUT TECHNOLOGIES AS DESCRIBED BY CMS-2020-01-R: HCPCS

## 2021-06-29 PROCEDURE — U0005 INFEC AGEN DETEC AMPLI PROBE: HCPCS

## 2021-06-29 PROCEDURE — 99213 OFFICE O/P EST LOW 20 MIN: CPT | Mod: CS | Performed by: PHYSICIAN ASSISTANT

## 2021-06-29 PROCEDURE — 87502 INFLUENZA DNA AMP PROBE: CPT

## 2021-06-29 PROCEDURE — 0240U HCHG SARS-COV-2 COVID-19 NFCT DS RESP RNA 3 TRGT MIC: CPT

## 2021-06-29 RX ORDER — AMOXICILLIN AND CLAVULANATE POTASSIUM 875; 125 MG/1; MG/1
1 TABLET, FILM COATED ORAL 2 TIMES DAILY
Qty: 10 TABLET | Refills: 0 | Status: SHIPPED | OUTPATIENT
Start: 2021-06-29 | End: 2021-07-04

## 2021-06-29 ASSESSMENT — ENCOUNTER SYMPTOMS
CHILLS: 0
SINUS PRESSURE: 1
COUGH: 0
HOARSE VOICE: 1
SORE THROAT: 1

## 2021-06-29 NOTE — PROGRESS NOTES
"Subjective:   Brooke Burciaga is a 21 y.o. female who presents for Sinus Problem (wound like sinus infection swab, congestion, runny nose, started on Sun.)        Sinusitis  This is a new problem. Episode onset: 3 day s. There has been no fever. Associated symptoms include congestion, a hoarse voice, sinus pressure and a sore throat. Pertinent negatives include no chills, coughing or ear pain. (Rhinorrhea) Treatments tried: otc decongestant, dayquil      Hx of sinusitis infection, seasonal allergies.     Pt employer requiring covid test to return to work. She does work in healthcare- pediatric dentistry.     Review of Systems   Constitutional: Negative for chills.   HENT: Positive for congestion, hoarse voice, sinus pressure and sore throat. Negative for ear pain.    Respiratory: Negative for cough.        PMH:  has no past medical history on file.  MEDS:   Current Outpatient Medications:   •  amoxicillin-clavulanate (AUGMENTIN) 875-125 MG Tab, Take 1 tablet by mouth 2 times a day for 5 days., Disp: 10 tablet, Rfl: 0  •  hydrOXYzine pamoate (VISTARIL) 25 MG Cap, Take 1 capsule up to 3 times daily for anxiety, Disp: 90 capsule, Rfl: 5  •  citalopram (CELEXA) 10 MG tablet, Take 1 tablet every day, Disp: 30 Tab, Rfl: 5  ALLERGIES: No Known Allergies  SURGHX: History reviewed. No pertinent surgical history.  SOCHX:  reports that she has never smoked. She has never used smokeless tobacco. She reports that she does not drink alcohol and does not use drugs.  History reviewed. No pertinent family history.     Objective:   /72 (BP Location: Left arm, Patient Position: Sitting, BP Cuff Size: Adult)   Pulse 77   Temp 36.9 °C (98.5 °F) (Temporal)   Resp 16   Ht 1.6 m (5' 3\")   Wt 79.4 kg (175 lb) Comment: aproximate weight, pt refused the scale  LMP 06/02/2021   SpO2 99%   Breastfeeding No   BMI 31.00 kg/m²     Physical Exam  Vitals reviewed.   Constitutional:       General: She is not in acute distress.     " Appearance: Normal appearance. She is well-developed. She is not ill-appearing.   HENT:      Head: Normocephalic and atraumatic.      Right Ear: Tympanic membrane normal.      Left Ear: Tympanic membrane normal.      Nose: Congestion present.      Mouth/Throat:      Pharynx: Posterior oropharyngeal erythema present. No oropharyngeal exudate.   Neck:      Trachea: No tracheal deviation.   Cardiovascular:      Rate and Rhythm: Normal rate.   Pulmonary:      Effort: Pulmonary effort is normal. No respiratory distress.      Breath sounds: No stridor. No wheezing or rhonchi.   Musculoskeletal:      Cervical back: Normal range of motion and neck supple. No rigidity.   Lymphadenopathy:      Cervical: No cervical adenopathy.   Skin:     General: Skin is warm and dry.      Capillary Refill: Capillary refill takes less than 2 seconds.   Neurological:      Mental Status: She is alert and oriented to person, place, and time.   Psychiatric:         Mood and Affect: Mood normal.         Behavior: Behavior normal.           Assessment/Plan:     1. Acute non-recurrent pansinusitis  amoxicillin-clavulanate (AUGMENTIN) 875-125 MG Tab   2. Exposure to COVID-19 virus  CoV-2 and Flu A/B by PCR (24 hour In-House): Collect NP swab in VTM     Supportive care reviewed.  Continue OTC decongestant, start Flonase, salt water gargle, nasal saline rinse.  She will be tested for Covid today. Patient was given a contingent antibiotic prescription to fill and use as directed if symptoms progressed as discussed and agreed upon.    Follow-up with primary care provider within 7-10 days.  If symptoms worsen or persist patient can return to clinic for reevaluation. Side effects of medication discussed. Patient confirmed understanding of information.    Please note that this dictation was created using voice recognition software. I have made every reasonable attempt to correct obvious errors, but I expect that there are errors of grammar and possibly  content that I did not discover before finalizing the note.

## 2021-06-29 NOTE — LETTER
June 29, 2021         Patient: Brooke Burciaga   YOB: 2000   Date of Visit: 6/29/2021           To Whom it May Concern:    Brooke Burciaga was seen in my clinic on 6/29/2021. She may return to work on 7/2/21 or sooner if symptoms improve and covid test is negative.    If you have any questions or concerns, please don't hesitate to call.        Sincerely,           Reta Perez P.A.-C.  Electronically Signed

## 2021-06-30 LAB
FLUAV RNA SPEC QL NAA+PROBE: NEGATIVE
FLUBV RNA SPEC QL NAA+PROBE: NEGATIVE
SARS-COV-2 RNA RESP QL NAA+PROBE: NOTDETECTED